# Patient Record
Sex: MALE | Race: WHITE
[De-identification: names, ages, dates, MRNs, and addresses within clinical notes are randomized per-mention and may not be internally consistent; named-entity substitution may affect disease eponyms.]

---

## 2020-11-23 ENCOUNTER — HOSPITAL ENCOUNTER (OUTPATIENT)
Dept: HOSPITAL 61 - KCIC | Age: 60
End: 2020-11-23
Attending: FAMILY MEDICINE
Payer: COMMERCIAL

## 2020-11-23 DIAGNOSIS — G95.89: ICD-10-CM

## 2020-11-23 DIAGNOSIS — M47.22: Primary | ICD-10-CM

## 2020-11-23 DIAGNOSIS — M25.78: ICD-10-CM

## 2020-11-23 DIAGNOSIS — J32.9: ICD-10-CM

## 2020-11-23 DIAGNOSIS — M48.02: ICD-10-CM

## 2020-11-23 PROCEDURE — 70220 X-RAY EXAM OF SINUSES: CPT

## 2020-11-23 PROCEDURE — 72050 X-RAY EXAM NECK SPINE 4/5VWS: CPT

## 2020-11-23 NOTE — [facility]
Three-view sinus radiographs 11/23/2020

 

CLINICAL HISTORY: Sinusitis. Tinnitus.

 

PA, Connell and lateral digital radiographs of the skull were obtained. The

paranasal sinuses are well aerated and are clear. No air-fluid level is 

seen. No skull fracture is noted. The orbits are intact.

 

IMPRESSION: There is no radiographic evidence of sinusitis.

 

Electronically signed by: Adrien Nicholson MD (11/23/2020 5:36 PM) [id number]

## 2020-11-23 NOTE — [facility]
5 view cervical spine radiographs 11/23/2020

 

Clinical history: Chronic neck pain.

 

AP, lateral, bilateral oblique and AP open mouth odontoid digital 

radiographs of the cervical spine were obtained. Minimal lateral curvature

of the cervical spine is seen convex to the left. There is straightening 

of the normal cervical lordosis. Degenerative changes consisting of 

vertebral endplate sclerosis and minimal to mild anterior and posterior 

vertebral body osteophyte formation are seen involving the mid and lower 

cervical disc spaces. Degenerative changes are seen involving the 

uncovertebral facet joints throughout the mid and lower cervical disc 

spaces. No fracture or subluxation is seen. No prevertebral soft tissue 

swelling is noted. Mild bilateral neural foraminal stenosis is seen at 

C6-7.

 

IMPRESSION: Degenerative changes are seen involving cervical spine as 

discussed above. No acute osseous abnormality is seen.

 

Electronically signed by: Adrien Nicholson MD (11/23/2020 5:12 PM) [id number]